# Patient Record
Sex: FEMALE | Race: WHITE | NOT HISPANIC OR LATINO | URBAN - NONMETROPOLITAN AREA
[De-identification: names, ages, dates, MRNs, and addresses within clinical notes are randomized per-mention and may not be internally consistent; named-entity substitution may affect disease eponyms.]

---

## 2019-03-27 ENCOUNTER — IMPORTED ENCOUNTER (OUTPATIENT)
Dept: URBAN - NONMETROPOLITAN AREA CLINIC 1 | Facility: CLINIC | Age: 64
End: 2019-03-27

## 2019-03-27 PROBLEM — H52.03: Noted: 2019-03-27

## 2019-03-27 PROBLEM — H25.13: Noted: 2019-03-27

## 2019-03-27 PROBLEM — D23.12: Noted: 2019-03-27

## 2019-03-27 PROBLEM — H52.223: Noted: 2019-03-27

## 2019-03-27 PROBLEM — H52.4: Noted: 2019-03-27

## 2019-03-27 PROBLEM — H04.123: Noted: 2019-03-27

## 2019-03-27 PROCEDURE — 92004 COMPRE OPH EXAM NEW PT 1/>: CPT

## 2019-03-27 PROCEDURE — 92015 DETERMINE REFRACTIVE STATE: CPT

## 2019-03-27 NOTE — PATIENT DISCUSSION
Nuclear Sclerosis OU -  discussed findings w/patient-  no treatment indicated at this time-  UV protection recommended-  monitor yearly or prn Papilloma RUL/MYRANDA-  discussed findings w/patient-  discussed referral to [facility] for Evaluation in the future patient defers at this time-  ok for patient to call for referral to [facility] -  will continue to monitor yearly or prn Dry Eye Syndrome OU-  discussed findings w/patient-  no treatment indicated at this time-  monitor yearly or prn Compound Hyperopic Astigmatism OU w/Presbyopia-  discussed findings w/patient-  new spectacle Rx issued-  monitor yearly or prn; 's Notes: MR 3/27/2019DFE 3/27/2019

## 2020-07-23 ENCOUNTER — IMPORTED ENCOUNTER (OUTPATIENT)
Dept: URBAN - NONMETROPOLITAN AREA CLINIC 1 | Facility: CLINIC | Age: 65
End: 2020-07-23

## 2020-07-23 PROCEDURE — 92014 COMPRE OPH EXAM EST PT 1/>: CPT

## 2020-07-23 PROCEDURE — 92015 DETERMINE REFRACTIVE STATE: CPT

## 2020-07-23 NOTE — PATIENT DISCUSSION
Nuclear Sclerosis OU -  discussed findings w/patient-  no treatment/ progression indicated at this time-  UV protection recommended-  monitor yearly or prn Papilloma RUL/MYRANDA-  discussed findings w/patient-  discussed referral to [facility] for Evaluation in the future patient defers at this time-  ok for patient to call for referral to [facility] -  will continue to monitor yearly or prn Dry Eye Syndrome OU-  discussed findings w/patient-  no treatment indicated at this time-  monitor yearly or prn Compound Hyperopic Astigmatism OU w/Presbyopia-  discussed findings w/patient-  new spectacle Rx issued today-  monitor yearly or prn; 's Notes: MR 7/23/2020DFE 7/23/2020

## 2020-07-26 PROBLEM — H25.13: Noted: 2019-03-27

## 2020-07-26 PROBLEM — H16.223: Noted: 2020-07-26

## 2020-07-26 PROBLEM — H52.03: Noted: 2019-03-27

## 2020-07-26 PROBLEM — H52.4: Noted: 2019-03-27

## 2020-07-26 PROBLEM — H52.223: Noted: 2019-03-27

## 2020-07-31 PROBLEM — H52.223: Noted: 2019-03-27

## 2020-07-31 PROBLEM — H52.03: Noted: 2019-03-27

## 2020-07-31 PROBLEM — D23.121: Noted: 2020-07-31

## 2020-07-31 PROBLEM — H52.4: Noted: 2019-03-27

## 2020-07-31 PROBLEM — H25.13: Noted: 2019-03-27

## 2020-07-31 PROBLEM — H16.223: Noted: 2020-07-26

## 2021-11-04 ENCOUNTER — IMPORTED ENCOUNTER (OUTPATIENT)
Dept: URBAN - NONMETROPOLITAN AREA CLINIC 1 | Facility: CLINIC | Age: 66
End: 2021-11-04

## 2021-11-04 PROCEDURE — 92015 DETERMINE REFRACTIVE STATE: CPT

## 2021-11-04 PROCEDURE — 92014 COMPRE OPH EXAM EST PT 1/>: CPT

## 2021-11-04 NOTE — PATIENT DISCUSSION
Nuclear Sclerosis OU -  discussed findings w/patient-  no treatment/ progression indicated at this time-  UV protection recommended-  monitor yearly or prn Papilloma RUL/MYRANDA-  discussed findings w/patient-  discussed referral to [address] [place] [address] for Evaluation in the future patient defers at this time-  ok for patient to call for referral to [address] [place] [address] -  will continue to monitor yearly or prn Dry Eye Syndrome OU-  discussed findings w/patient-  no treatment indicated at this time-  monitor yearly or prn Compound Hyperopic Astigmatism OU w/Presbyopia-  discussed findings w/patient-  new spectacle Rx issued today-  monitor yearly or prn; 's Notes: MR 11/4/2021D 11/4/2021

## 2022-04-09 ASSESSMENT — VISUAL ACUITY
OS_CC: 20/30
OD_SC: 20/20
OD_SC: 20/20
OU_CC: 20/30
OS_SC: 20/30
OS_SC: 20/20
OD_CC: 20/30
OU_CC: 20/20
OU_CC: J1

## 2022-04-09 ASSESSMENT — TONOMETRY
OS_IOP_MMHG: 13
OS_IOP_MMHG: 14
OD_IOP_MMHG: 13
OD_IOP_MMHG: 16
OS_IOP_MMHG: 16
OD_IOP_MMHG: 14

## 2022-11-15 ENCOUNTER — COMPREHENSIVE EXAM (OUTPATIENT)
Dept: URBAN - NONMETROPOLITAN AREA CLINIC 4 | Facility: CLINIC | Age: 67
End: 2022-11-15

## 2022-11-15 DIAGNOSIS — H52.4: ICD-10-CM

## 2022-11-15 DIAGNOSIS — H25.13: ICD-10-CM

## 2022-11-15 DIAGNOSIS — Z79.899: ICD-10-CM

## 2022-11-15 DIAGNOSIS — H52.03: ICD-10-CM

## 2022-11-15 DIAGNOSIS — H52.223: ICD-10-CM

## 2022-11-15 PROCEDURE — 92134 CPTRZ OPH DX IMG PST SGM RTA: CPT

## 2022-11-15 PROCEDURE — 92015 DETERMINE REFRACTIVE STATE: CPT

## 2022-11-15 PROCEDURE — 92014 COMPRE OPH EXAM EST PT 1/>: CPT

## 2022-11-15 ASSESSMENT — VISUAL ACUITY
OS_SC: 20/50-2
OS_PH: 20/20
OD_PH: 20/20
OD_SC: 20/40+1

## 2022-11-15 ASSESSMENT — TONOMETRY
OS_IOP_MMHG: 17
OD_IOP_MMHG: 17

## 2023-12-28 ENCOUNTER — COMPREHENSIVE EXAM (OUTPATIENT)
Dept: URBAN - NONMETROPOLITAN AREA CLINIC 4 | Facility: CLINIC | Age: 68
End: 2023-12-28

## 2023-12-28 DIAGNOSIS — H02.886: ICD-10-CM

## 2023-12-28 DIAGNOSIS — H25.13: ICD-10-CM

## 2023-12-28 DIAGNOSIS — H02.883: ICD-10-CM

## 2023-12-28 DIAGNOSIS — H52.223: ICD-10-CM

## 2023-12-28 DIAGNOSIS — H16.223: ICD-10-CM

## 2023-12-28 DIAGNOSIS — Z79.899: ICD-10-CM

## 2023-12-28 DIAGNOSIS — H52.4: ICD-10-CM

## 2023-12-28 DIAGNOSIS — H52.03: ICD-10-CM

## 2023-12-28 PROCEDURE — 92134 CPTRZ OPH DX IMG PST SGM RTA: CPT

## 2023-12-28 PROCEDURE — 92014 COMPRE OPH EXAM EST PT 1/>: CPT

## 2023-12-28 ASSESSMENT — VISUAL ACUITY
OS_BAT: 20/60
OS_SC: 20/25-2
OD_SC: 20/25
OD_BAT: 20/50

## 2023-12-28 ASSESSMENT — TONOMETRY
OS_IOP_MMHG: 16
OD_IOP_MMHG: 17

## 2024-12-10 ENCOUNTER — COMPREHENSIVE EXAM (OUTPATIENT)
Age: 69
End: 2024-12-10

## 2024-12-10 DIAGNOSIS — H25.13: ICD-10-CM

## 2024-12-10 DIAGNOSIS — H52.4: ICD-10-CM

## 2024-12-10 DIAGNOSIS — H16.223: ICD-10-CM

## 2024-12-10 DIAGNOSIS — H52.03: ICD-10-CM

## 2024-12-10 DIAGNOSIS — Z79.899: ICD-10-CM

## 2024-12-10 DIAGNOSIS — H02.88B: ICD-10-CM

## 2024-12-10 DIAGNOSIS — H52.223: ICD-10-CM

## 2024-12-10 DIAGNOSIS — H02.88A: ICD-10-CM

## 2024-12-10 PROCEDURE — 92014 COMPRE OPH EXAM EST PT 1/>: CPT

## 2024-12-10 PROCEDURE — 92015 DETERMINE REFRACTIVE STATE: CPT
